# Patient Record
Sex: MALE | Race: WHITE | Employment: FULL TIME | URBAN - NONMETROPOLITAN AREA
[De-identification: names, ages, dates, MRNs, and addresses within clinical notes are randomized per-mention and may not be internally consistent; named-entity substitution may affect disease eponyms.]

---

## 2023-11-15 ENCOUNTER — HOSPITAL ENCOUNTER (INPATIENT)
Age: 43
LOS: 3 days | Discharge: HOME | End: 2023-11-18
Attending: PSYCHIATRY & NEUROLOGY | Admitting: PSYCHIATRY & NEUROLOGY
Payer: COMMERCIAL

## 2023-11-15 PROBLEM — F32.9 MDD (MAJOR DEPRESSIVE DISORDER), SINGLE EPISODE: Status: ACTIVE | Noted: 2023-11-15

## 2023-11-15 PROCEDURE — 1240000000 HC EMOTIONAL WELLNESS R&B

## 2023-11-15 RX ORDER — MAGNESIUM HYDROXIDE/ALUMINUM HYDROXICE/SIMETHICONE 120; 1200; 1200 MG/30ML; MG/30ML; MG/30ML
30 SUSPENSION ORAL EVERY 6 HOURS PRN
Status: DISCONTINUED | OUTPATIENT
Start: 2023-11-15 | End: 2023-11-18 | Stop reason: HOSPADM

## 2023-11-15 RX ORDER — TRAZODONE HYDROCHLORIDE 50 MG/1
50 TABLET ORAL NIGHTLY PRN
Status: DISCONTINUED | OUTPATIENT
Start: 2023-11-15 | End: 2023-11-18 | Stop reason: HOSPADM

## 2023-11-15 RX ORDER — IBUPROFEN 400 MG/1
400 TABLET ORAL EVERY 6 HOURS PRN
Status: DISCONTINUED | OUTPATIENT
Start: 2023-11-15 | End: 2023-11-16

## 2023-11-15 RX ORDER — NICOTINE 21 MG/24HR
1 PATCH, TRANSDERMAL 24 HOURS TRANSDERMAL DAILY
Status: DISCONTINUED | OUTPATIENT
Start: 2023-11-16 | End: 2023-11-18 | Stop reason: HOSPADM

## 2023-11-15 RX ORDER — HYDROXYZINE HYDROCHLORIDE 25 MG/1
50 TABLET, FILM COATED ORAL 3 TIMES DAILY PRN
Status: DISCONTINUED | OUTPATIENT
Start: 2023-11-15 | End: 2023-11-18 | Stop reason: HOSPADM

## 2023-11-15 RX ORDER — ACETAMINOPHEN 325 MG/1
650 TABLET ORAL EVERY 4 HOURS PRN
Status: DISCONTINUED | OUTPATIENT
Start: 2023-11-15 | End: 2023-11-18 | Stop reason: HOSPADM

## 2023-11-16 PROBLEM — F33.2 MDD (MAJOR DEPRESSIVE DISORDER), RECURRENT SEVERE, WITHOUT PSYCHOSIS (HCC): Status: ACTIVE | Noted: 2023-11-15

## 2023-11-16 PROCEDURE — 1240000000 HC EMOTIONAL WELLNESS R&B

## 2023-11-16 PROCEDURE — 99223 1ST HOSP IP/OBS HIGH 75: CPT | Performed by: PSYCHIATRY & NEUROLOGY

## 2023-11-16 PROCEDURE — 6370000000 HC RX 637 (ALT 250 FOR IP): Performed by: PHYSICIAN ASSISTANT

## 2023-11-16 PROCEDURE — 6370000000 HC RX 637 (ALT 250 FOR IP): Performed by: PSYCHIATRY & NEUROLOGY

## 2023-11-16 PROCEDURE — APPSS30 APP SPLIT SHARED TIME 16-30 MINUTES: Performed by: PHYSICIAN ASSISTANT

## 2023-11-16 RX ORDER — DEXTROAMPHETAMINE SACCHARATE, AMPHETAMINE ASPARTATE, DEXTROAMPHETAMINE SULFATE AND AMPHETAMINE SULFATE 7.5; 7.5; 7.5; 7.5 MG/1; MG/1; MG/1; MG/1
30 TABLET ORAL DAILY
COMMUNITY

## 2023-11-16 RX ORDER — VENLAFAXINE HYDROCHLORIDE 37.5 MG/1
37.5 CAPSULE, EXTENDED RELEASE ORAL
Status: DISCONTINUED | OUTPATIENT
Start: 2023-11-17 | End: 2023-11-17

## 2023-11-16 RX ORDER — FLUOXETINE HYDROCHLORIDE 40 MG/1
60 CAPSULE ORAL DAILY
Status: ON HOLD | COMMUNITY
End: 2023-11-18 | Stop reason: HOSPADM

## 2023-11-16 RX ORDER — FLUOXETINE HYDROCHLORIDE 20 MG/1
40 CAPSULE ORAL DAILY
Status: DISCONTINUED | OUTPATIENT
Start: 2023-11-16 | End: 2023-11-17

## 2023-11-16 RX ORDER — PANTOPRAZOLE SODIUM 40 MG/1
40 TABLET, DELAYED RELEASE ORAL
Status: DISCONTINUED | OUTPATIENT
Start: 2023-11-16 | End: 2023-11-18 | Stop reason: HOSPADM

## 2023-11-16 RX ORDER — IBUPROFEN 800 MG/1
800 TABLET ORAL EVERY 6 HOURS PRN
Status: DISCONTINUED | OUTPATIENT
Start: 2023-11-16 | End: 2023-11-18 | Stop reason: HOSPADM

## 2023-11-16 RX ORDER — IBUPROFEN 800 MG/1
1600 TABLET ORAL EVERY MORNING
COMMUNITY

## 2023-11-16 RX ORDER — DEXTROAMPHETAMINE SACCHARATE, AMPHETAMINE ASPARTATE, DEXTROAMPHETAMINE SULFATE AND AMPHETAMINE SULFATE 2.5; 2.5; 2.5; 2.5 MG/1; MG/1; MG/1; MG/1
30 TABLET ORAL DAILY
Status: DISCONTINUED | OUTPATIENT
Start: 2023-11-16 | End: 2023-11-18 | Stop reason: HOSPADM

## 2023-11-16 RX ORDER — OMEPRAZOLE 20 MG/1
40 CAPSULE, DELAYED RELEASE ORAL DAILY
COMMUNITY

## 2023-11-16 RX ADMIN — HYDROXYZINE HYDROCHLORIDE 50 MG: 25 TABLET, FILM COATED ORAL at 07:54

## 2023-11-16 RX ADMIN — PANTOPRAZOLE SODIUM 40 MG: 40 TABLET, DELAYED RELEASE ORAL at 11:01

## 2023-11-16 RX ADMIN — ACETAMINOPHEN 650 MG: 325 TABLET ORAL at 00:36

## 2023-11-16 RX ADMIN — FLUOXETINE 40 MG: 20 CAPSULE ORAL at 11:01

## 2023-11-16 RX ADMIN — DEXTROAMPHETAMINE SACCHARATE, AMPHETAMINE ASPARTATE, DEXTROAMPHETAMINE SULFATE, AMPHETAMINE SULFATE TABLETS, 10 MG,CLL 30 MG: 2.5; 2.5; 2.5; 2.5 TABLET ORAL at 11:01

## 2023-11-16 ASSESSMENT — SLEEP AND FATIGUE QUESTIONNAIRES
AVERAGE NUMBER OF SLEEP HOURS: 7
DO YOU HAVE DIFFICULTY SLEEPING: YES
SLEEP PATTERN: DIFFICULTY FALLING ASLEEP
DO YOU USE A SLEEP AID: NO

## 2023-11-16 ASSESSMENT — PATIENT HEALTH QUESTIONNAIRE - PHQ9
SUM OF ALL RESPONSES TO PHQ9 QUESTIONS 1 & 2: 2
SUM OF ALL RESPONSES TO PHQ QUESTIONS 1-9: 2
1. LITTLE INTEREST OR PLEASURE IN DOING THINGS: 0
2. FEELING DOWN, DEPRESSED OR HOPELESS: 2
SUM OF ALL RESPONSES TO PHQ QUESTIONS 1-9: 2

## 2023-11-16 ASSESSMENT — PAIN SCALES - GENERAL
PAINLEVEL_OUTOF10: 1
PAINLEVEL_OUTOF10: 0
PAINLEVEL_OUTOF10: 0

## 2023-11-16 ASSESSMENT — PAIN DESCRIPTION - PAIN TYPE: TYPE: ACUTE PAIN

## 2023-11-16 ASSESSMENT — LIFESTYLE VARIABLES
HOW MANY STANDARD DRINKS CONTAINING ALCOHOL DO YOU HAVE ON A TYPICAL DAY: PATIENT DOES NOT DRINK
HOW OFTEN DO YOU HAVE A DRINK CONTAINING ALCOHOL: NEVER

## 2023-11-16 ASSESSMENT — PAIN DESCRIPTION - LOCATION: LOCATION: HEAD

## 2023-11-16 ASSESSMENT — PAIN DESCRIPTION - ONSET: ONSET: ON-GOING

## 2023-11-16 ASSESSMENT — PAIN DESCRIPTION - DESCRIPTORS: DESCRIPTORS: ACHING

## 2023-11-16 ASSESSMENT — PAIN DESCRIPTION - ORIENTATION: ORIENTATION: ANTERIOR

## 2023-11-16 ASSESSMENT — PAIN DESCRIPTION - FREQUENCY: FREQUENCY: INTERMITTENT

## 2023-11-16 ASSESSMENT — PAIN - FUNCTIONAL ASSESSMENT: PAIN_FUNCTIONAL_ASSESSMENT: ACTIVITIES ARE NOT PREVENTED

## 2023-11-16 NOTE — PROGRESS NOTES
[facility]  Initial Interdisciplinary Treatment Plan NOTE    REVIEW DATE AND TIME: 11/16/23  1548    PATIENT was IN TREATMENT TEAM.  See Multidisciplinary Treatment Team sheet for participants. ADMISSION TYPE:   Admission Type: Involuntary    REASON FOR ADMISSION:  Reason for Admission: increased suicidal ideations      Estimated Length of Stay Update:  11/17/23  Estimated Discharge Date Update: 3-5 days    Patient Strengths/Barriers  Strengths (Must Choose Two): Stable housing, Support from family, Support from friends  Barriers: Other (comment) (not accepting his divorce)  Addictive Behavior:Addictive Behavior  In the Past 3 Months, Have You Felt or Has Someone Told You That You Have a Problem With  : None  Medical Problems:History reviewed. No pertinent past medical history. EDUCATION:   Learner Progress Toward Treatment Goals: Reviewed results and recommendations of this team, Reviewed group plan and strategies, Reviewed signs, symptoms and risk of self harm and violent behavior, and Reviewed goals and plan of care    Method: Individual    Outcome: Verbalized understanding and Demonstrated Understanding    PATIENT GOALS: Acceptance    OQ TOP QUALITY PRIORITIES FOR THE PATIENT AS IDENTIFIED ON ADMISSION ADMINISTRATION:        ND    PLAN/TREATMENT RECOMMENDATIONS UPDATE:   What is the most important thing we can help you with while you are here? See above  Who is your support system? Friends, therapist  Do you have follow-up providers? [facility]  Do you have the ability to pay for your medications? [employer]  private insurance. Where will you be residing when you leave the hospital? Own residence in [facility] [place]  Will need a return to work slip or FMLA paper completion?  Yes      GOALS UPDATE:   Time frame for Short-Term Goals: Daily    RO Lazo

## 2023-11-16 NOTE — PLAN OF CARE
Problem: Pain  Goal: Verbalizes/displays adequate comfort level or baseline comfort level  Outcome: Progressing  Note: Patient denies pain at this time, will monitor     Problem: Self Harm/Suicidality  Goal: Will have no self-injury during hospital stay  Description: INTERVENTIONS:  1. Ensure constant observer at bedside with Q15M safety checks  2. Maintain a safe environment  3. Secure patient belongings  4. Ensure family/visitors adhere to safety recommendations  5. Ensure safety tray has been added to patient's diet order  6. Every shift and PRN: Re-assess suicidal risk via Frequent Screener    Outcome: Progressing  Note: No self injury during hospital stay at this time, will continue to monitor     Problem: Risk for Elopement  Goal: Patient will not exit the unit/facility without proper excort  Outcome: Progressing  Flowsheets (Taken 11/16/2023 0055 by Corina Conklin RN)  Nursing Interventions for Elopement Risk:   Communicate to physician the risk for elopement   Reduce environmental triggers  Note: Patient did not attempt to exit the facility this shift, will continue to monitor     Problem: Discharge Planning  Goal: Discharge to home or other facility with appropriate resources  Outcome: Progressing  Note: Discharge is yet to be scheduled and will follow  at [facility]     Problem: Depression  Goal: Will be euthymic at discharge  Description: INTERVENTIONS:  1. Administer medication as ordered  2. Provide emotional support via 1:1 interaction with staff  3. Encourage involvement in milieu/groups/activities  4. Monitor for social isolation  Outcome: Not Progressing  Note: Patient verbalizes having depressive symptoms, rates his mood #8, has flat, depressed affect and does brighten some with interaction, will continue to monitor     Problem: Depression  Goal: Will be euthymic at discharge  Description: INTERVENTIONS:  1. Administer medication as ordered  2.  Provide emotional support via 1:1 interaction with staff  3. Encourage involvement in milieu/groups/activities  4. Monitor for social isolation  Outcome: Not Progressing  Note: Patient verbalizes having depressive symptoms, rates his mood #8, has flat, depressed affect and does brighten some with interaction, will continue to monitor       Care plan reviewed with patient . Patient  verbalizes understanding of the plan of care and contributes to goal setting.

## 2023-11-16 NOTE — GROUP NOTE
Group Therapy Note    Date: 11/16/2023    Group Start Time: 1500  Group End Time: 6017  Group Topic: Healthy Living/Wellness    [facility] Adult [facility] 4E    Carola Copeland LPN        Group Therapy Note    Attendees: 4       Notes:  attended    Status After Intervention:  Improved    Participation Level:  Active Listener and Interactive    Participation Quality: Appropriate, Attentive, and Sharing      Speech:  normal      Thought Process/Content: Logical      Affective Functioning: Flat      Level of consciousness:  Alert, Oriented x4, and Attentive      Response to Learning: Able to verbalize current knowledge/experience, Able to verbalize/acknowledge new learning, Able to retain information, and Capable of insight      Endings: None Reported    Modes of Intervention: Education and Support      Discipline Responsible: Licensed Practical Nurse      Signature:  Carola Copeland LPN

## 2023-11-16 NOTE — PROGRESS NOTES
Behavioral Health   Admission Note   Admission Type: Involuntary    Reason for Admission: increased suicidal ideations    Patient Strengths/Barriers  Strengths (Must Choose Two): Stable housing, Support from family, Support from friends  Barriers: Other (comment) (not accepting his divorce)    Addictive Behavior  In the Past 3 Months, Have You Felt or Has Someone Told You That You Have a Problem With  : None    Medical Problems:   History reviewed. No pertinent past medical history. Status EXAM:  Mental Status and Behavioral Exam  Normal: Yes  Level of Assistance: Independent/Self  Facial Expression: Brightened  Affect: Appropriate  Level of Consciousness: Alert  Frequency of Checks: 4 times per hour, close  Mood:Normal: No  Mood: Depressed, Anxious  Motor Activity:Normal: Yes  Eye Contact: Good  Observed Behavior: Cooperative  Sexual Misconduct History: Current - no  Preception: [place] to person, [place] to time, [place] to place, [place] to situation  Attention:Normal: Yes  Thought Processes: Unremarkable  Thought Content:Normal: Yes  Thought Content: Other (comment) (wnl)  Depression Symptoms: Other (comment) (states he is depressed)  Anxiety Symptoms: Generalized  Madeleine Symptoms: No problems reported or observed. Hallucinations: None  Delusions: No  Memory:Normal: Yes  Insight and Judgment: No  Insight and Judgment: Poor judgment    Pt admitted with followings belongings:  Dental Appliances: None  Vision - Corrective Lenses: Eyeglasses  Hearing Aid: None  Jewelry: None  Body Piercings Removed: N/A  Clothing: At home, Other (Comment) (daughter took home all belongings)  Other Valuables: At home     Admission order obtained Yes  Belongings sent home with daughter Jessika Gomez took home all belongings when at [address] placed in safe in security envelope, number:  none. Patient's home medications were none.   Patient oriented to surroundings and program expectations and copy of patient rights given. Received admission packet:  Yes  Consents reviewed, signed Yes. Outcomes Questionnaire completed {Responses; yes/no/refused/notapproropriate:Yes. \"An Important Message from [employer] About Your Rights\" form reviewed, signed: N/A . \"An Important Message from [address] [place], [place] [address]za Rd Rights\" form reviewed, signed: N/A  Patient verbalize understanding: Yes. Patient informed of 15 minute safety monitoring: YES/NO/NA: yes          Patient screened positive for suicide risk on CSSR-S (\"yes\" to question #4, 5, OR 6)  yes. Physician notified of risk score yes  Constant Observer Orders received no . 2 person skin assessment completed upon admission refused . Explained patients right to have family, representative or physician notified of their admission. Patient has Declined for physician to be notified. Patient has Declined for family/representative to be notified. Provided pt with [employer] handout entitled \"Quitting Smoking. \"  Reviewed handout with pt addressing dangers of smoking, developing coping skills, and providing basic information about quitting. Pt response to counseling:  refused     Admission summary: Pt arrived via stretcher with ambulance staff from [facility]. Pt is alert and oriented x 4. Affect bright. Pt states him and his wife of 23 years  last February with the hopes that they would get back together. Pt states today she told him she wants a divorce and states he became upset and suicidal went home and made a noose out of rope and put it in a box and took it with him to his counseling appointment at [facility] today. Pt states has been suicidal for months and has been on prozac for awhile and it will help then it will wear off then the suicidal ideations will return. Pt denies suicidal ideations during assessment. Denies homicidal ideations. Denies hallucinations and no delusions noted. Pt states he is depressed and anxious.   Pt states he is

## 2023-11-16 NOTE — PROGRESS NOTES
INPATIENT RECREATIONAL THERAPY  ADULT BEHAVIORAL SERVICES  ASSESSMENT    REFERRING PHYSICIAN: Dr. Juani Wong  DIAGNOSIS:   MDD  PRECAUTIONS:  Suicide precautions  HISTORY OF PRESENT ILLNESS/INJURY:   PMH:  Please see medical chart for prior medical history, allergies, and medication.     HISTORY OF PSYCHIATRIC TREATMENT: no  YOB: 1980  GENDER:  Male  MARITAL STATUS:  Legally    EMPLOYMENT STATUS:  employed  LIVING SITUATION:  House with children  IDENTIFIED SUPPORT SYSTEM:  children, cousin Opal Varela, friend Maurice Carlton,   EDUCATIONAL LEVEL: college associates   MEDICATION/DRUG USE: prescription medication    COGNITION: intact  ATTENTION: good  RELAXATION: play video games, hang out with kids, fix things  SELF-ESTEEM:  have good and bad days, \"usually I'm pretty full of myself\"  MOTIVATION:  good, hopeful    SOCIAL SKILLS:  good  FRUSTRATION TOLERANCE:  feels can handle things better  ATTENTION SEEKING: no  COOPERATION: good  AFFECT: flat affect although engaging  APPEARANCE: appropriate    HEARING:  n/a  VISION:  glasses   VERBAL COMMUNICATION:  appropriate  WRITTEN COMMUNICATION:  appropriate    COORDINATION: appropriate   MOBILITY: appropriate    GOALS:  hopeful to return home with children
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
## 2023-11-16 NOTE — PROGRESS NOTES
Behavioral Services  Medicare Certification Upon Admission    I certify that this patient's inpatient psychiatric hospital admission is medically necessary for:    [x] (1) Treatment which could reasonably be expected to improve this patient's condition,       [x] (2) Or for diagnostic study;     AND     [x](2) The inpatient psychiatric services are provided while the individual is under the care of a physician and are included in the individualized plan of care.     Estimated length of stay/service 3-5 days    Plan for post-hospital care hc    Electronically signed by Nikki Keene MD on 11/16/2023 at 8:54 AM
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
## 2023-11-17 PROCEDURE — 99232 SBSQ HOSP IP/OBS MODERATE 35: CPT | Performed by: PSYCHIATRY & NEUROLOGY

## 2023-11-17 PROCEDURE — APPSS30 APP SPLIT SHARED TIME 16-30 MINUTES: Performed by: PHYSICIAN ASSISTANT

## 2023-11-17 PROCEDURE — 6370000000 HC RX 637 (ALT 250 FOR IP): Performed by: PHYSICIAN ASSISTANT

## 2023-11-17 PROCEDURE — 1240000000 HC EMOTIONAL WELLNESS R&B

## 2023-11-17 PROCEDURE — 6370000000 HC RX 637 (ALT 250 FOR IP): Performed by: PSYCHIATRY & NEUROLOGY

## 2023-11-17 RX ORDER — FLUOXETINE HYDROCHLORIDE 20 MG/1
20 CAPSULE ORAL DAILY
Status: COMPLETED | OUTPATIENT
Start: 2023-11-18 | End: 2023-11-18

## 2023-11-17 RX ORDER — VENLAFAXINE HYDROCHLORIDE 75 MG/1
75 CAPSULE, EXTENDED RELEASE ORAL
Status: DISCONTINUED | OUTPATIENT
Start: 2023-11-18 | End: 2023-11-18 | Stop reason: HOSPADM

## 2023-11-17 RX ADMIN — DEXTROAMPHETAMINE SACCHARATE, AMPHETAMINE ASPARTATE, DEXTROAMPHETAMINE SULFATE, AMPHETAMINE SULFATE TABLETS, 10 MG,CLL 30 MG: 2.5; 2.5; 2.5; 2.5 TABLET ORAL at 07:32

## 2023-11-17 RX ADMIN — HYDROXYZINE HYDROCHLORIDE 50 MG: 25 TABLET, FILM COATED ORAL at 15:05

## 2023-11-17 RX ADMIN — FLUOXETINE 40 MG: 20 CAPSULE ORAL at 07:32

## 2023-11-17 RX ADMIN — VENLAFAXINE HYDROCHLORIDE 37.5 MG: 37.5 CAPSULE, EXTENDED RELEASE ORAL at 07:32

## 2023-11-17 RX ADMIN — PANTOPRAZOLE SODIUM 40 MG: 40 TABLET, DELAYED RELEASE ORAL at 07:32

## 2023-11-17 RX ADMIN — IBUPROFEN 800 MG: 800 TABLET, FILM COATED ORAL at 08:06

## 2023-11-17 ASSESSMENT — PAIN SCALES - GENERAL
PAINLEVEL_OUTOF10: 0
PAINLEVEL_OUTOF10: 1

## 2023-11-17 ASSESSMENT — PAIN - FUNCTIONAL ASSESSMENT
PAIN_FUNCTIONAL_ASSESSMENT: ACTIVITIES ARE NOT PREVENTED
PAIN_FUNCTIONAL_ASSESSMENT: ACTIVITIES ARE NOT PREVENTED

## 2023-11-17 ASSESSMENT — PAIN DESCRIPTION - LOCATION: LOCATION: HEAD

## 2023-11-17 ASSESSMENT — PAIN DESCRIPTION - DESCRIPTORS: DESCRIPTORS: ACHING

## 2023-11-17 NOTE — PROGRESS NOTES
This RN has reviewed and agrees with King Abigail LPN's data collection and has collaborated with this LPN regarding the patient's care plan.

## 2023-11-17 NOTE — PROGRESS NOTES
[facility]  Day 3 Interdisciplinary Treatment Plan NOTE    Review Date & Time: 11/17/23      Patient was in treatment team    Admission Type:   Admission Type: Involuntary    Reason for admission:  Reason for Admission: increased suicidal ideations  Estimated Length of Stay Update:  11/21/23  Estimated Discharge Date Update: 1-2 days    Patient Strengths/Barriers  Strengths (Must Choose Two): Stable housing, Support from family, Support from friends  Barriers: Other (comment) (not accepting his divorce)  Addictive Behavior:Addictive Behavior  In the Past 3 Months, Have You Felt or Has Someone Told You That You Have a Problem With  : None  Medical Problems:History reviewed. No pertinent past medical history. Risk:  Fall Risk   Gus Scale Gus Scale Score: 22    Status EXAM:   Mental Status and Behavioral Exam  Normal: Yes  Level of Assistance: Independent/Self  Facial Expression: Brightened  Affect: Appropriate  Level of Consciousness: Alert  Frequency of Checks: 4 times per hour, close  Mood:Normal: No  Mood: Depressed, Other (comment) (states depression is better.)  Motor Activity:Normal: Yes  Eye Contact: Good  Observed Behavior: Cooperative  Sexual Misconduct History: Current - no  Preception: [place] to person, [place] to time, [place] to place, [place] to situation  Attention:Normal: Yes  Thought Processes: Unremarkable  Thought Content:Normal: Yes  Thought Content: Other (comment) (wnl)  Depression Symptoms: Other (comment) (states his depression is better.)  Anxiety Symptoms: No problems reported or observed. Madeleine Symptoms: No problems reported or observed.   Hallucinations: None  Delusions: No  Memory:Normal: Yes  Insight and Judgment: No  Insight and Judgment: Poor judgment    Daily Assessment Last Entry:   Daily Sleep (WDL): Within Defined Limits            Daily Nutrition (WDL): Within Defined Limits  Level of Assistance: Independent/Self    Patient Monitoring:  Frequency of Checks: 4

## 2023-11-17 NOTE — GROUP NOTE
Group Therapy Note    Date: 11/17/2023    Group Start Time: 1100  Group End Time: 36  Group Topic: Healthy Living/Wellness    [facility] Adult [facility] 4E    Ella Brandt LPN        Group Therapy Note    Attendees: 4       Notes:  attended    Status After Intervention:  Improved    Participation Level:  Active Listener and Interactive    Participation Quality: Appropriate, Attentive, Sharing, and Supportive      Speech:  normal      Thought Process/Content: Logical      Affective Functioning: Flat      Level of consciousness:  Alert, Oriented x4, and Attentive      Response to Learning: Able to verbalize current knowledge/experience, Able to verbalize/acknowledge new learning, Able to retain information, and Capable of insight      Endings: None Reported    Modes of Intervention: Education, Support, and Socialization      Discipline Responsible: Licensed Practical Nurse      Signature:  Ella Brandt LPN

## 2023-11-17 NOTE — PROGRESS NOTES
Group Therapy Note    Date: 11/16/2023  Start Time: 2000  End Time:  2020  Number of Participants:     Type of Group: Relaxation    Wellness Binder Information  Module Name:    Session Number:      Patient's Goal: to call his kids     Notes:  goal met     Status After Intervention:  Unchanged    Participation Level:  Active Listener and Interactive    Participation Quality: Appropriate      Speech:  normal      Thought Process/Content: Logical      Affective Functioning: Congruent      Mood: depressed      Level of consciousness:  Alert      Response to Learning: Able to verbalize current knowledge/experience, Able to verbalize/acknowledge new learning, and Able to retain information      Endings: None Reported    Modes of Intervention: Support and Socialization      Discipline Responsible: Registered Nurse      Signature:  Polina Monte RN

## 2023-11-17 NOTE — PROGRESS NOTES
Department of Psychiatry  Progress Note     Chief Complaint:  suicidal ideation with plan to hang self     PROGRESS:  Poly Miranda presented to the interview room. He appeared much brighter today compared to yesterday. Christiana Holly reported he is feeling a lot better today. He stated that he had a really good talk with Chaka Sidhu social work yesterday. He stated that Chaka Sidhu really put things into perspective for him. He also stated that he realized in Carolann's group yesterday afternoon that he is not the only one going through this. Poly Miranda stated that he is going to work on ZMP" regarding the fact that his marriage is over. He stated that he wants to focus on himself and his children moving forward. He said he will have to have discussions with his wife regarding the legality of their separation but is not going to hold onto the hope that they can reconcile their marriage. Poly Miranda also mentioned that he took a pill for anxiety yesterday which helped him a lot. He stated he was able to get some rest and when he woke up, he felt a lot better. Poly Miranda rated his mood 10 out of 10 with 10 being the best this morning. He reported that his depression was better today. He was not tearful during the interview today. He denied any active suicidal thoughts this morning. He was remorseful about the suicidal gesture with the noose stating that it was \"dumb\" and that he owes people at [facility] an apology. He reported he was feeling more hopeful about his situation today. Poly Miranda reported he has been sleeping and eating well here on the unit. Staff reported he slept 8.5 hours continuous last night. He has been compliant with his medications. He denied any side effects. He has been attending groups. He has been talking to his children and friends on the phone. He endosrsed having good support     Bill reported that he will need assistance with [facility].  Return to work date will be 11/27    Suicidal ideations: denied active suicidal ideation   Compliance with

## 2023-11-17 NOTE — PLAN OF CARE
Problem: Pain  Goal: Verbalizes/displays adequate comfort level or baseline comfort level  11/17/2023 1148 by Ilana Coombs LPN  Outcome: Not Progressing  Note: Medication given for pain, see MAR  11/16/2023 2236 by Deirdre Hashimoto, RN  Outcome: Progressing  Flowsheets (Taken 11/16/2023 2236)  Verbalizes/displays adequate comfort level or baseline comfort level: Assess pain using appropriate pain scale  Note: Denies pain this shift. Problem: Self Harm/Suicidality  Goal: Will have no self-injury during hospital stay  Description: INTERVENTIONS:  1. Ensure constant observer at bedside with Q15M safety checks  2. Maintain a safe environment  3. Secure patient belongings  4. Ensure family/visitors adhere to safety recommendations  5. Ensure safety tray has been added to patient's diet order  6. Every shift and PRN: Re-assess suicidal risk via Frequent Screener    11/17/2023 1148 by Ilana Coombs LPN  Outcome: Progressing  Note: No self injury during hospital stay at this time, will continue to monitor  11/16/2023 2236 by Deirdre Hashimoto, RN  Outcome: Progressing  Flowsheets (Taken 11/16/2023 2236)  Will have no self-injury during hospital stay:   Maintain a safe environment   Every shift and PRN: Re-assess suicidal risk via Frequent Screener  Note: Denies suicidal ideations this shift. No self harm behaviors or thoughts. Problem: Risk for Elopement  Goal: Patient will not exit the unit/facility without proper excort  11/17/2023 1148 by Ilana Coombs LPN  Outcome: Progressing  Note: Patient did not attempt to exit the facility this shift, will monitor  11/16/2023 2236 by Deirdre Hashimoto, RN  Outcome: Progressing  Flowsheets (Taken 11/16/2023 2236)  Nursing Interventions for Elopement Risk:   Communicate to physician the risk for elopement   Reduce environmental triggers  Note: No issues with elopement this shift.       Problem: Discharge Planning  Goal: Discharge to home or other facility

## 2023-11-17 NOTE — PROGRESS NOTES
Group Therapy Note    Date: 11/17/2023  Start Time: 1330  End Time:  4126  Number of Participants: 5    Type of Group: Psychotherapy    Notes:  Pt is present for group and introduced self to the group. The group members explored the topic of acceptance as the shared personal experiences related to their life situations and mental health disorders. Group members were introduced to CBT concepts and examined the origin of their own self defeating beliefs. There was discussion around ways in which those beliefs may be challenged and modified. Status After Intervention:  Improved    Participation Level:  Active Listener and Interactive    Participation Quality: Appropriate, Attentive, Sharing, and Supportive      Speech:  normal      Thought Process/Content: Logical  Linear      Affective Functioning: Congruent      Mood: dysphoric      Level of consciousness:  Alert, Oriented x4, and Attentive      Response to Learning: Able to verbalize current knowledge/experience, Able to verbalize/acknowledge new learning, Able to retain information, Capable of insight, Able to change behavior, and Progressing to goal      Endings: None Reported    Modes of Intervention: Education, Support, Socialization, Exploration, Clarifying, and Problem-solving      Discipline Responsible: /Counselor      Signature:  RO Sears

## 2023-11-17 NOTE — PROGRESS NOTES
OQ Admission    Most Recent Score:    61    Baseline Score:   61    Change From Initial: No Reliable Change  Distress Level: Mild - Moderate       Graph Type: Total  Most Recent Critical Item Status:  7. Suicide - I have thoughts of ending my life. Sometimes  11. Substance Abuse - I use alcohol or a drug to get going in the morning. Never  20. Substance Abuse - People criticize my drinking (or drug use). Never  24. Substance Abuse - I have trouble at work/school or other daily activities because of drinking or drug use.  Never

## 2023-11-17 NOTE — PLAN OF CARE
Problem: Pain  Goal: Verbalizes/displays adequate comfort level or baseline comfort level  11/16/2023 2236 by Bhargav Michelle RN  Outcome: Progressing  Flowsheets (Taken 11/16/2023 2236)  Verbalizes/displays adequate comfort level or baseline comfort level: Assess pain using appropriate pain scale  Note: Denies pain this shift. 11/16/2023 1038 by Jovi Sharpe LPN  Outcome: Progressing  Note: Patient denies pain at this time, will monitor     Problem: Self Harm/Suicidality  Goal: Will have no self-injury during hospital stay  Description: INTERVENTIONS:  1. Ensure constant observer at bedside with Q15M safety checks  2. Maintain a safe environment  3. Secure patient belongings  4. Ensure family/visitors adhere to safety recommendations  5. Ensure safety tray has been added to patient's diet order  6. Every shift and PRN: Re-assess suicidal risk via Frequent Screener    11/16/2023 2236 by Bhargav Michelle RN  Outcome: Progressing  Flowsheets (Taken 11/16/2023 2236)  Will have no self-injury during hospital stay:   Maintain a safe environment   Every shift and PRN: Re-assess suicidal risk via Frequent Screener  Note: Denies suicidal ideations this shift. No self harm behaviors or thoughts. 11/16/2023 1038 by Jovi Sharpe LPN  Outcome: Progressing  Note: No self injury during hospital stay at this time, will continue to monitor     Problem: Risk for Elopement  Goal: Patient will not exit the unit/facility without proper excort  11/16/2023 2236 by Bhargav Michelle RN  Outcome: Progressing  Flowsheets (Taken 11/16/2023 2236)  Nursing Interventions for Elopement Risk:   Communicate to physician the risk for elopement   Reduce environmental triggers  Note: No issues with elopement this shift.    11/16/2023 1038 by Jovi Sharpe LPN  Outcome: Progressing  Flowsheets (Taken 11/16/2023 0055 by Bhargav Michelle RN)  Nursing Interventions for Elopement Risk:   Communicate to physician the risk for elopement   Reduce environmental triggers  Note: Patient did not attempt to exit the facility this shift, will continue to monitor     Problem: Discharge Planning  Goal: Discharge to home or other facility with appropriate resources  11/16/2023 2236 by Deirdre Hashimoto, RN  Outcome: Progressing  Flowsheets (Taken 11/16/2023 2236)  Discharge to home or other facility with appropriate resources: Identify barriers to discharge with patient and caregiver  11/16/2023 1038 by Ilana Coombs LPN  Outcome: Progressing  Note: Discharge is yet to be scheduled and will follow  at [facility]     Problem: Depression  Goal: Will be euthymic at discharge  Description: INTERVENTIONS:  1. Administer medication as ordered  2. Provide emotional support via 1:1 interaction with staff  3. Encourage involvement in milieu/groups/activities  4. Monitor for social isolation  11/16/2023 2236 by Deirdre Hashimoto, RN  Outcome: Progressing  Note: States depression is better since admission. Rates his mood a 10 with 10 being the best. Affect bright. Good eye contact. States he is hopeful for the future. Good peer interaction.    11/16/2023 1038 by Ilana Coombs LPN  Outcome: Not Progressing  Note: Patient verbalizes having depressive symptoms, rates his mood #8, has flat, depressed affect and does brighten some with interaction, will continue to monitor

## 2023-11-17 NOTE — PROGRESS NOTES
Discharge planning-Jimy is scheduled for a follow up appointment on 11/21/23 at 2:00 pm with Kenia GALVEZ.  Jr Grace is scheduled for a follow up appointment with his therapist on 11/22/23 at 3:00 pm.

## 2023-11-18 VITALS
DIASTOLIC BLOOD PRESSURE: 99 MMHG | OXYGEN SATURATION: 99 % | SYSTOLIC BLOOD PRESSURE: 137 MMHG | HEART RATE: 92 BPM | WEIGHT: 286 LBS | HEIGHT: 75 IN | TEMPERATURE: 98.3 F | BODY MASS INDEX: 35.56 KG/M2 | RESPIRATION RATE: 16 BRPM

## 2023-11-18 PROCEDURE — 6370000000 HC RX 637 (ALT 250 FOR IP): Performed by: PSYCHIATRY & NEUROLOGY

## 2023-11-18 PROCEDURE — 99239 HOSP IP/OBS DSCHRG MGMT >30: CPT | Performed by: PSYCHIATRY & NEUROLOGY

## 2023-11-18 PROCEDURE — 6370000000 HC RX 637 (ALT 250 FOR IP): Performed by: PHYSICIAN ASSISTANT

## 2023-11-18 PROCEDURE — 5130000000 HC BRIDGE APPOINTMENT

## 2023-11-18 RX ORDER — VENLAFAXINE HYDROCHLORIDE 75 MG/1
75 CAPSULE, EXTENDED RELEASE ORAL
Qty: 30 CAPSULE | Refills: 0 | Status: SHIPPED | OUTPATIENT
Start: 2023-11-18

## 2023-11-18 RX ORDER — HYDROXYZINE 50 MG/1
50 TABLET, FILM COATED ORAL 3 TIMES DAILY PRN
Qty: 30 TABLET | Refills: 0 | Status: SHIPPED | OUTPATIENT
Start: 2023-11-18 | End: 2023-11-28

## 2023-11-18 RX ADMIN — DEXTROAMPHETAMINE SACCHARATE, AMPHETAMINE ASPARTATE, DEXTROAMPHETAMINE SULFATE, AMPHETAMINE SULFATE TABLETS, 10 MG,CLL 30 MG: 2.5; 2.5; 2.5; 2.5 TABLET ORAL at 09:06

## 2023-11-18 RX ADMIN — HYDROXYZINE HYDROCHLORIDE 50 MG: 25 TABLET, FILM COATED ORAL at 09:06

## 2023-11-18 RX ADMIN — VENLAFAXINE HYDROCHLORIDE 75 MG: 75 CAPSULE, EXTENDED RELEASE ORAL at 09:06

## 2023-11-18 RX ADMIN — FLUOXETINE 20 MG: 20 CAPSULE ORAL at 09:06

## 2023-11-18 RX ADMIN — PANTOPRAZOLE SODIUM 40 MG: 40 TABLET, DELAYED RELEASE ORAL at 09:06

## 2023-11-18 ASSESSMENT — PAIN SCALES - GENERAL: PAINLEVEL_OUTOF10: 0

## 2023-11-18 NOTE — PLAN OF CARE
Problem: Pain  Goal: Verbalizes/displays adequate comfort level or baseline comfort level  11/18/2023 0011 by Crystal Rodríguez RN  Outcome: Progressing  Note: Patient denies pain or discomfort  11/17/2023 1148 by Liang Garzon LPN  Outcome: Not Progressing  Note: Medication given for pain, see MAR     Problem: Self Harm/Suicidality  Goal: Will have no self-injury during hospital stay  Description: INTERVENTIONS:  1. Ensure constant observer at bedside with Q15M safety checks  2. Maintain a safe environment  3. Secure patient belongings  4. Ensure family/visitors adhere to safety recommendations  5. Ensure safety tray has been added to patient's diet order  6. Every shift and PRN: Re-assess suicidal risk via Frequent Screener    11/18/2023 0011 by Crystal Rodríguez RN  Outcome: Progressing  Note: Pt remained safe and free of harm this shift   11/17/2023 1148 by Liang Garzon LPN  Outcome: Progressing  Note: No self injury during hospital stay at this time, will continue to monitor     Problem: Risk for Elopement  Goal: Patient will not exit the unit/facility without proper excort  11/18/2023 0011 by Crystal Rodríguez RN  Outcome: Progressing  Note: Patient made no attempt to leave the unit  11/17/2023 1148 by Liang Garzon LPN  Outcome: Progressing  Note: Patient did not attempt to exit the facility this shift, will monitor     Problem: Discharge Planning  Goal: Discharge to home or other facility with appropriate resources  11/18/2023 0011 by Crystal Rodríguez RN  Outcome: Progressing  Note: Patient plans to be discharged home and follow with [facility]  11/17/2023 1148 by Liang Garzon LPN  Outcome: Progressing  Note: Patient to be discharged to his home and follow up at [facility]     Problem: Depression  Goal: Will be euthymic at discharge  Description: INTERVENTIONS:  1. Administer medication as ordered  2. Provide emotional support via 1:1 interaction with staff  3.  Encourage involvement in

## 2023-11-18 NOTE — BH NOTE
This RN has reviewed and agrees with TERENCE Foote LPN's data collection and has collaborated with this LPN regarding the patient's care plan.

## 2023-11-18 NOTE — BH NOTE
Group Therapy Note    Date: 11/17/2023  Start Time: 2000  End Time:  2020  Number of Participants: 1    Type of Group: Wrap-Up    Wellness Binder Information  Module Name:    Session Number:      Patient's Goal:  talk to doctor, go to group    Notes:  met    Status After Intervention:  Improved    Participation Level: Active Listener    Participation Quality: Attentive      Speech:  normal      Thought Process/Content: Logical      Affective Functioning: Congruent      Mood: anxious      Level of consciousness:  Alert      Response to Learning: Able to verbalize current knowledge/experience      Endings: None Reported    Modes of Intervention: Education      Discipline Responsible: Registered Nurse      Signature:   Cristina Delgado RN

## 2023-11-18 NOTE — DISCHARGE INSTRUCTIONS
[facility] [facility] Hotline:  [phone]    Crisis phone numbers:  Jaycee Gaffney, and [employer] [facility] [phone]. Darrian Rife, and [place] [address] Physicians [facility] [phone]. Rob and [facility] [phone]. [address] [place]. [facility], and [facility] [phone]. [facility], [address]  [address]  [address] [facility]  [address]  [address]  [facility] [address] [facility]  [address] [facility] [address]    [facility]  [address]  [facility]  [address] [facility] [address] [facility] [address] [facility]  Recovery and [place] [facility]  [address] [facility], [address] [facility],[address]  [phone]    [facility]  [address]  [address] [facility] [address], [address] [facility] [address]  [address]  [address]  [place], [address] the [facility],[address]  [facility]  [address]  [facility]  [address] [facility],[address], [address] [facility]  Recovery and [place] [facility]  [address] [facility]  [phone]    [facility]  [address] [facility],[address] [facility], [address]  [address]  [address], [id number]   [facility], [address]  [address]  [address] [facility], [address]  [address] [facility]  [address], [address] [facility]  [address]  [address] [facility], [address]  [phone]

## 2023-11-18 NOTE — BH NOTE
Behavioral Health   Discharge Note    Pt discharged with followings belongings:   Dental Appliances: None  Vision - Corrective Lenses: Eyeglasses  Hearing Aid: None  Jewelry: None  Body Piercings Removed: N/A  Clothing: At home, Other (Comment) (daughter took home all belongings)  Other Valuables: At home   Valuables retrieved from safe, security envelope number:  n/a and returned to patient. Patient left department with staff via cab. Discharged to home. \"An Important Message from Medicare About Your Rights\" (IMM) form photocopy original from admission and provided to pt at least 4 hours prior to discharge N/A. \"An Important Message from [address] [place] [address] Rd Rights\" (IMM) form photocopy original from admission. N/A. If pt left within 4 hours of receiving 2nd delivery of IMM, this is because pt was agreeable with hospital discharge. Patient/guardian education on aftercare instructions: Yes  Bridge appointment completed:  yes. Reviewed Discharge Instructions with patient/family/nursing facility. Patient/family verbalizes understanding and agreement with the discharge plan using the teachback method.    Information faxed to n/a by n/a Patient/family verbalize understanding of AVS:Yes    Status EXAM upon discharge:  Mental Status and Behavioral Exam  Normal: No  Level of Assistance: Independent/Self  Facial Expression: Flat, Brightened  Affect: Appropriate  Level of Consciousness: Alert  Frequency of Checks: 4 times per hour, close  Mood:Normal: Yes  Mood: Depressed, Other (comment) (states depression is better.)  Motor Activity:Normal: Yes  Eye Contact: Good  Observed Behavior: Cooperative, Friendly  Sexual Misconduct History: Current - no  Preception: Rock to person, Rock to time, Rock to place, Rock to situation  Attention:Normal: Yes  Thought Processes: Unremarkable  Thought Content:Normal: Yes  Thought Content: Other (comment) (wnl)  Depression Symptoms: No problems reported or observed. Anxiety Symptoms: No problems reported or observed. Madeleine Symptoms: No problems reported or observed.   Hallucinations: None  Delusions: No  Memory:Normal: Yes  Insight and Judgment: No  Insight and Judgment: Poor judgment    Leopold Angel, LPN

## 2023-11-18 NOTE — DISCHARGE INSTR - DIET
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
## 2023-11-20 ENCOUNTER — TELEPHONE (OUTPATIENT)
Dept: PSYCHIATRY | Age: 43
End: 2023-11-20